# Patient Record
Sex: FEMALE
[De-identification: names, ages, dates, MRNs, and addresses within clinical notes are randomized per-mention and may not be internally consistent; named-entity substitution may affect disease eponyms.]

---

## 2023-07-08 ENCOUNTER — HOSPITAL ENCOUNTER (EMERGENCY)
Dept: HOSPITAL 97 - ER | Age: 73
Discharge: HOME | End: 2023-07-08
Payer: SELF-PAY

## 2023-07-08 VITALS — OXYGEN SATURATION: 95 % | TEMPERATURE: 98.9 F

## 2023-07-08 VITALS — SYSTOLIC BLOOD PRESSURE: 154 MMHG | DIASTOLIC BLOOD PRESSURE: 84 MMHG

## 2023-07-08 DIAGNOSIS — C34.32: ICD-10-CM

## 2023-07-08 DIAGNOSIS — R52: ICD-10-CM

## 2023-07-08 DIAGNOSIS — I10: ICD-10-CM

## 2023-07-08 DIAGNOSIS — J91.0: Primary | ICD-10-CM

## 2023-07-08 LAB
ALBUMIN SERPL BCP-MCNC: 3.1 G/DL (ref 3.4–5)
ALP SERPL-CCNC: 184 U/L (ref 45–117)
ALT SERPL W P-5'-P-CCNC: 17 U/L (ref 13–56)
AST SERPL W P-5'-P-CCNC: 23 U/L (ref 15–37)
BLD SMEAR INTERP: (no result)
BUN BLD-MCNC: 10 MG/DL (ref 7–18)
GLUCOSE SERPLBLD-MCNC: 126 MG/DL (ref 74–106)
HCT VFR BLD CALC: 37.3 % (ref 36–45)
LIPASE SERPL-CCNC: 12 U/L (ref 13–75)
LYMPHOCYTES # SPEC AUTO: 0.4 K/UL (ref 0.7–4.9)
MCV RBC: 80.9 FL (ref 80–100)
MORPHOLOGY BLD-IMP: (no result)
PMV BLD: 7.2 FL (ref 7.6–11.3)
POTASSIUM SERPL-SCNC: 3.6 MEQ/L (ref 3.5–5.1)
RBC # BLD: 4.61 M/UL (ref 3.86–4.86)

## 2023-07-08 PROCEDURE — 96374 THER/PROPH/DIAG INJ IV PUSH: CPT

## 2023-07-08 PROCEDURE — 80053 COMPREHEN METABOLIC PANEL: CPT

## 2023-07-08 PROCEDURE — 99284 EMERGENCY DEPT VISIT MOD MDM: CPT

## 2023-07-08 PROCEDURE — 96361 HYDRATE IV INFUSION ADD-ON: CPT

## 2023-07-08 PROCEDURE — 36415 COLL VENOUS BLD VENIPUNCTURE: CPT

## 2023-07-08 PROCEDURE — 83690 ASSAY OF LIPASE: CPT

## 2023-07-08 PROCEDURE — 96375 TX/PRO/DX INJ NEW DRUG ADDON: CPT

## 2023-07-08 PROCEDURE — 85025 COMPLETE CBC W/AUTO DIFF WBC: CPT

## 2023-07-08 PROCEDURE — 71045 X-RAY EXAM CHEST 1 VIEW: CPT

## 2023-07-08 NOTE — ER
Nurse's Notes                                                                                     

 [facility]                                                                 

Name: Clare Cespedes                                                                           

Age: 73 yrs                                                                                       

Sex: Female                                                                                       

: 1950                                                                                   

MRN: [id number]                                                                                   

Arrival Date: 2023                                                                          

Time: 11:44                                                                                       

Account#: [id number]                                                                            

Bed 5                                                                                             

Private MD:                                                                                       

Diagnosis: Malignant pleural effusion;Malignant neoplasm of lower lobe, left bronchus or          

  lung;Acute pain, not elsewhere classified-metastatic adenocarcinoma of the lung                 

                                                                                                  

Presentation:                                                                                     

                                                                                             

11:51 Note Patient in restroom when called for triage.                                        nj1 

12:04 Chief complaint: Patient's son or daughter states: Patient has history of lung cancer,  nj1 

      recently diagnosed. They drove from [place] to [place] for the weekend, unsure if it     

      was too much for patient. Patient does take strong pain medication. Has a fentanyl          

      patch on and has gotten oxycodone, last dose at around 10am. Coronavirus screen:            

      Vaccine status: Patient reports receiving the 2nd dose of the covid vaccine. Ebola          

      Screen: Patient denies travel to an Ebola-affected area in the 21 days before illness       

      onset. Initial Sepsis Screen: Does the patient meet any 2 criteria? No. Patient's           

      initial sepsis screen is negative. Does the patient have a suspected source of              

      infection? No. Patient's initial sepsis screen is negative. Risk Assessment: Do you         

      want to hurt yourself or someone else? Patient reports no desire to harm self or            

      others. Onset of symptoms was 2023.                                                

12:04 Method Of Arrival: Ambulatory                                                           nj1 

12:04 Acuity: EDIN 3                                                                           nj1 

                                                                                                  

Historical:                                                                                       

- Allergies:                                                                                      

12:09 No Known Allergies;                                                                     nj1 

- PMHx:                                                                                           

12:09 Lung adenocarcinoma; Hypertensive disorder;                                             nj1 

- PSHx:                                                                                           

12:09 Cholecystectomy;                                                                        nj1 

                                                                                                  

- Immunization history:: Client reports receiving the 2nd dose of the Covid vaccine.              

- Social history:: Smoking status: Patient denies any tobacco usage or history of.                

- Family history:: not pertinent.                                                                 

                                                                                                  

                                                                                                  

Screenin:24 [facility] ED Fall Risk Assessment (Adult) History of falling in the last 3 months,       vg1 

      including since admission No falls in past 3 months (0 pts). Abuse screen: Denies           

      threats or abuse. Denies injuries from another. Nutritional screening: No deficits          

      noted. Tuberculosis screening: No symptoms or risk factors identified.                      

                                                                                                  

Assessment:                                                                                       

12:24 Reassessment: Pt has a FENTANYL PATCH on that was placed 23 at 2222; also took      vg1 

      OXYCODONE TODAY AT 1000. General: Appears uncomfortable, Behavior is cooperative. Pain:     

      Complains of pain in left scapular area, left subscapular area and left mid back Pain       

      currently is 9 out of 10 on a pain scale. Current management is with Fentanyl patch and     

      oxycodone; was DX with Lung CANCER in May 2023. Neuro: Level of Consciousness is awake,     

      alert, obeys commands, Oriented to person, place, time, situation. Cardiovascular:          

      Patient's skin is warm and dry. Respiratory: Airway is patent Respiratory effort is         

      even, unlabored. GI: Reports nausea. : No signs and/or symptoms were reported             

      regarding the genitourinary system. EENT: No signs and/or symptoms were reported            

      regarding the EENT system. Derm: Skin is pink, warm \T\ dry. Musculoskeletal:               

      Circulation, motion, and sensation intact.                                                  

13:05 Reassessment: Xray at bedside.                                                          vg1 

13:48 Reassessment: Patient appears in no apparent distress at this time. Patient and/or      vg1 

      family updated on plan of care and expected duration. Pain level reassessed. Patient is     

      alert, oriented x 3, equal unlabored respirations, skin warm/dry/pink. pain has             

      decreased.                                                                                  

                                                                                                  

Vital Signs:                                                                                      

12:04  / 65; Pulse 87; Resp 18; Temp 98.9(O); Pulse Ox 95% ; Weight 54.43 kg; Height 4  nj1 

      ft. 11 in. ; Pain 8/10;                                                                     

12:24  / 87; Pulse 86; Resp 16; Pulse Ox 96% on R/A;                                    vg1 

13:30  / 84; Pulse 80; Resp 20; Pulse Ox 95% on R/A;                                    vg1 

12:04 Body Mass Index 24.24 (54.43 kg, 149.86 cm)                                             nj1 

12:04 Pain Scale: Adult                                                                       [facility] 

                                                                                                  

ED Course:                                                                                        

11:45 Patient arrived in ED.                                                                  im  

11:48 Shakir Bah MD is Attending Physician.                                             [facility] 

12:09 Triage completed.                                                                       nj1 

12:10 Arm band placed on right wrist.                                                         nj1 

12:14 Estela Swift, BRYN is Primary Nurse.                                                  vg1 

12:24 Patient has correct armband on for positive identification. Bed in low position. Call   vg1 

      light in reach. Side rails up X2. Adult w/ patient.                                         

13:05 Initial lab(s) drawn, by me, sent to lab. Inserted saline lock: 22 gauge in right       vg1 

      antecubital area, using aseptic technique. Blood collected.                                 

13:09 Chest Single View XRAY In Process Unspecified.                                          EDMS

14:04 Aldo Nair MD is Referral Physician.                                             [facility] 

                                                                                                  

Administered Medications:                                                                         

13:18 Drug: NS 0.9%  ml Route: IV; Rate: bolus; Site: right antecubital;                ap3 

15:23 Follow up: IV Status: Completed infusion                                                ap3 

13:18 Drug: HYDROmorphone IVP 1 mg Route: IVP; Site: right antecubital;                       ap3 

15:23 Follow up: Response: No adverse reaction; Pain is decreased                             ap3 

13:18 Drug: Ondansetron IVP 4 mg Route: IVP; Site: right antecubital;                         ap3 

15:23 Follow up: Response: No adverse reaction                                                ap3 

13:18 Drug: fentaNYL Transdermal Patch (50 mcg/hr) 1 patches Route: Transdermal; Site:        ap3 

      affected area;                                                                              

                                                                                                  

                                                                                                  

Medication:                                                                                       

12:24 VIS not applicable for this client.                                                     vg1 

                                                                                                  

Outcome:                                                                                          

14:06 Discharge ordered by MD.                                                                [facility] 

15:18 Patient left the ED.                                                                    vg1 

                                                                                                  

Signatures:                                                                                       

Dispatcher [employer]                           EDMS                                                 

Shakir Bah MD MD cha Prokisch, Amanda RN                    RN   ap3                                                  

Estela Swift, RN                    RN   vg1                                                  

Stacy Rivera RN                         RN   nj1                                                  

Katlyn Louise                                                   

                                                                                                  

Corrections: (The following items were deleted from the chart)                                    

12:10 12:09 PMHx: Lung CA; nj1                                                                nj1 

13:57 12:05 Inserted saline lock: 22 gauge in left forearm, using aseptic technique. Blood    vg1 

      collected. vg1                                                                              

13:57 12:05 Initial lab(s) drawn, by me, sent to lab. vg1                                     vg1 

                                                                                                  

**************************************************************************************************

## 2023-07-08 NOTE — RAD REPORT
EXAM DESCRIPTION:  [place] Single View7/8/2023 1:07 pm

 

CLINICAL HISTORY:  Chest pain

 

COMPARISON:  none

 

FINDINGS:  Opacification of mid and lower left hemithorax

 

Right lung appears clear of acute infiltrate

 

Left hemidiaphragm is elevated

 

Heart is probably mildly enlarged

 

Nonspecific 6 millimeter sclerosis left humeral head

 

IMPRESSION:  Opacification mid and lower left hemithorax may represent a combination of mass, atelect
asis and/or infiltrate. There may be a pleural effusion present as well.

## 2023-07-08 NOTE — EDPHYS
Physician Documentation                                                                           

 [facility]                                                                 

Name: Clare Cespedes                                                                           

Age: 73 yrs                                                                                       

Sex: Female                                                                                       

: 1950                                                                                   

MRN: [id number]                                                                                   

Arrival Date: 2023                                                                          

Time: 11:44                                                                                       

Account#: [id number]                                                                            

Bed 5                                                                                             

Private MD:                                                                                       

ED Physician Shakir Bah                                                                      

HPI:                                                                                              

                                                                                             

12:58 This 73 yrs old  Female presents to ER via Ambulatory with complaints of Flank  torrie 

      Pain.                                                                                       

12:58 The patient complains of pain in the left low back and left mid back.                   torrie 

12:58 The patient presents with abdominal pain in the upper abdomen, in the left lower        torrie 

      quadrant, abdominal distention in the upper abdomen, in the lower abdomen. Onset: The       

      symptoms/episode began/occurred 1 month(s) ago. The pain does not radiate. Onset: The       

      symptoms/episode began/occurred 3 month(s) ago. Modifying factors: The symptoms are         

      alleviated by nothing. the symptoms are aggravated by movement. Associated signs and        

      symptoms: The patient has no apparent associated signs or symptoms. Associated signs        

      and symptoms: Pertinent positives: shortness of breath. Modifying factors: The symptoms     

      are alleviated by remaining still, the symptoms are aggravated by movement, walking.        

      Severity of pain: At its worst the pain was moderate severe in the emergency department     

      the pain is unchanged. The patient has experienced similar episodes in the past,            

      multiple times.                                                                             

                                                                                                  

Historical:                                                                                       

- Allergies:                                                                                      

12:09 No Known Allergies;                                                                     nj1 

- PMHx:                                                                                           

12:09 Lung adenocarcinoma; Hypertensive disorder;                                             nj1 

- PSHx:                                                                                           

12:09 Cholecystectomy;                                                                        nj1 

                                                                                                  

- Immunization history:: Client reports receiving the 2nd dose of the Covid vaccine.              

- Social history:: Smoking status: Patient denies any tobacco usage or history of.                

- Family history:: not pertinent.                                                                 

                                                                                                  

                                                                                                  

ROS:                                                                                              

12:58 Constitutional: Negative for fever, chills, and weight loss, Eyes: Negative for injury, torrie 

      pain, redness, and discharge, ENT: Negative for injury, pain, and discharge, Neck:          

      Negative for injury, pain, and swelling, Cardiovascular: Negative for chest pain,           

      palpitations, and edema, Back: Negative for injury and pain, : Negative for injury,       

      bleeding, discharge, and swelling, MS/Extremity: Negative for injury and deformity,         

      Neuro: Negative for headache, weakness, numbness, tingling, and seizure, Psych:             

      Negative for depression, anxiety, suicide ideation, homicidal ideation, and                 

      hallucinations, Allergy/Immunology: Negative for hives, rash, and allergies, Endocrine:     

      Negative for neck swelling, polydipsia, polyuria, polyphagia, and marked weight             

      changes, Hematologic/Lymphatic: Negative for swollen nodes, abnormal bleeding, and          

      unusual bruising.                                                                           

12:58 Respiratory: Positive for cough, with no reported sputum.                                   

12:58 Abdomen/GI: Positive for abdominal pain, of the posterior aspect of left lateral            

      abdomen, anterior aspect of left lateral abdomen, left upper quadrant and left lower        

      quadrant.                                                                                   

12:58 Skin: Positive for pallor.                                                                  

                                                                                                  

Exam:                                                                                             

12:58 Constitutional:  This is a well developed, well nourished patient who is awake, alert,  torrie 

      and in no acute distress. Head/Face:  Normocephalic, atraumatic. Eyes:  Pupils equal        

      round and reactive to light, extra-ocular motions intact.  Lids and lashes normal.          

      Conjunctiva and sclera are non-icteric and not injected.  Cornea within normal limits.      

      Periorbital areas with no swelling, redness, or edema. ENT:  Nares patent. No nasal         

      discharge, no septal abnormalities noted.  Tympanic membranes are normal and external       

      auditory canals are clear.  Oropharynx with no redness, swelling, or masses, exudates,      

      or evidence of obstruction, uvula midline.  Mucous membranes moist. Neck:  Trachea          

      midline, no thyromegaly or masses palpated, and no cervical lymphadenopathy.  Supple,       

      full range of motion without nuchal rigidity, or vertebral point tenderness.  No            

      Meningismus. Chest/axilla:  Normal chest wall appearance and motion.  Nontender with no     

      deformity.  No lesions are appreciated. Cardiovascular:  Regular rate and rhythm with a     

      normal S1 and S2.  No gallops, murmurs, or rubs.  Normal PMI, no JVD.  No pulse             

      deficits. Back:  No spinal tenderness.  No costovertebral tenderness.  Full range of        

      motion. Female :  Normal external genitalia. MS/ Extremity:  Pulses equal, no             

      cyanosis.  Neurovascular intact.  Full, normal range of motion. Neuro:  Awake and           

      alert, GCS 15, oriented to person, place, time, and situation.  Cranial nerves II-XII       

      grossly intact.  Motor strength 5/5 in all extremities.  Sensory grossly intact.            

      Cerebellar exam normal.  Normal gait. Psych:  Awake, alert, with orientation to person,     

      place and time.  Behavior, mood, and affect are within normal limits.                       

12:58 Respiratory: the patient does not display signs of respiratory distress,  Respirations:     

      no acute changes, is not noted, labored breathing, is not present, Breath sounds:           

      decreased breath sounds, that are moderate, are heard in the  left lower lobe, right        

      posterior upper lobe, right posterior middle lobe and right posterior lower lobe.           

12:58 Abdomen/GI: Inspection: distension, Bowel sounds: active, Palpation: soft, Liver: no        

      appreciated palpable abnormalities, Hernia: not appreciated.                                

12:58 Skin: Appearance: Color: pale, Temperature: normal temperature, Moisture: normal            

      moisture, petechiae, not noted, ecchymosis, not noted, abscess, not appreciated,            

      cellulitis, is not appreciated, induration, is not appreciated.                             

                                                                                                  

Vital Signs:                                                                                      

12:04  / 65; Pulse 87; Resp 18; Temp 98.9(O); Pulse Ox 95% ; Weight 54.43 kg; Height 4  nj1 

      ft. 11 in. ; Pain 8/10;                                                                     

12:24  / 87; Pulse 86; Resp 16; Pulse Ox 96% on R/A;                                    vg1 

13:30  / 84; Pulse 80; Resp 20; Pulse Ox 95% on R/A;                                    vg1 

12:04 Body Mass Index 24.24 (54.43 kg, 149.86 cm)                                             nj1 

12:04 Pain Scale: Adult                                                                       nj1 

                                                                                                  

MDM:                                                                                              

11:48 Patient medically screened.                                                             torrie 

13:04 Differential diagnosis: UTI, pancreatitis, bowel obstruction, diverticulitis. Data      torrie 

      reviewed: vital signs, nurses notes, lab test result(s), EKG, radiologic studies, plain     

      films. Consideration of Admission/Observation Escalation of care including                  

      admission/observation considered. I considered the following discharge prescriptions or     

      medication management in the emergency department Medications were administered in the      

      Emergency Department. See MAR. Independent interpretation of the following test(s) in       

      the Emergency Department X-Ray: My interpretation is cxr . Test considered but Not          

      performed: MRI: no mri. Care significantly affected by the following chronic                

      conditions: Hypertension, Cancer. Counseling: I had a detailed discussion with the          

      patient and/or guardian regarding: the historical points, exam findings, and any            

      diagnostic results supporting the discharge/admit diagnosis, lab results, radiology         

      results.                                                                                    

                                                                                                  

                                                                                             

12:51 Order name: CBC with Diff                                                               torrie 

                                                                                             

12:51 Order name: Comprehensive Metabolic Panel; Complete Time: 14:02                         [facility] 

                                                                                             

12:51 Order name: Lipase; Complete Time: 14:02                                                [facility] 

                                                                                             

13:27 Order name: CBC Smear Scan                                                              [facility]

                                                                                             

12:51 Order name: Chest Single View XRAY; Complete Time: 14:02                                [facility] 

                                                                                                  

Administered Medications:                                                                         

13:18 Drug: NS 0.9%  ml Route: IV; Rate: bolus; Site: right antecubital;                ap3 

15:23 Follow up: IV Status: Completed infusion                                                ap3 

13:18 Drug: HYDROmorphone IVP 1 mg Route: IVP; Site: right antecubital;                       ap3 

15:23 Follow up: Response: No adverse reaction; Pain is decreased                             ap3 

13:18 Drug: Ondansetron IVP 4 mg Route: IVP; Site: right antecubital;                         ap3 

15:23 Follow up: Response: No adverse reaction                                                ap3 

13:18 Drug: fentaNYL Transdermal Patch (50 mcg/hr) 1 patches Route: Transdermal; Site:        ap3 

      affected area;                                                                              

                                                                                                  

                                                                                                  

Disposition Summary:                                                                              

23 14:06                                                                                    

Discharge Ordered                                                                                 

      Location: Home                                                                          torrie 

      Problem: new                                                                            torrie 

      Symptoms: have improved                                                                 torrie 

      Condition: Stable                                                                       torrie 

      Diagnosis                                                                                   

        - Malignant pleural effusion                                                          torrie 

        - Malignant neoplasm of lower lobe, left bronchus or lung                             torrie 

        - Acute pain, not elsewhere classified - metastatic adenocarcinoma of the lung        torrie 

      Followup:                                                                               torrie 

        - With: Private Physician                                                                  

        - When: 1 - 2 days                                                                         

        - Reason: Recheck today's complaints, Continuance of care, Re-evaluation by your           

      physician                                                                                   

      Followup:                                                                               torrie 

        - With: Aldo Nair MD                                                               

        - When: 2 - 3 days                                                                         

        - Reason: Recheck today's complaints, Continuance of care, Re-evaluation by your           

      physician                                                                                   

      Discharge Instructions:                                                                     

        - Discharge Summary Sheet                                                             torrie 

        - Pleural Effusion                                                                    torrie 

        - Pleurisy                                                                            torrie 

        - Lung Cancer                                                                         torrie 

        - Pleurisy, Easy-to-Read                                                              torrie 

      Forms:                                                                                      

        - Medication Reconciliation Form                                                      torrie 

        - Thank You Letter                                                                    torrie 

        - Antibiotic Education                                                                torrie 

        - Prescription Opioid Use                                                             torrie 

        - [email or web address]                                                 torrie 

Signatures:                                                                                       

Dispatcher [employer]                           Shakir Saldana MD MD cha Prokisch, Amanda RN                    RN   ap3                                                  

Stacy Rivera RN                         RN   nj1                                                  

                                                                                                  

Corrections: (The following items were deleted from the chart)                                    

12:10 12:09 PMHx: Lung CA; nj1                                                                nj1 

                                                                                                  

**************************************************************************************************